# Patient Record
(demographics unavailable — no encounter records)

---

## 2025-03-26 NOTE — HISTORY OF PRESENT ILLNESS
[FreeTextEntry1] : Referring from PCP Dr. Jamison Lauren is a 56y M with HBeAg- chronic hepatitis B (Dx in 6/2022, treatment naive) who presents for the follow-up. PT also has hypertension, hyperlipidemia, diabetes on metformin 1g.  - Pt was admitted to [facility] for severe headache. Does note he had LP on 8/19, workup at the time found to have bilateral superior ophthalmic vein dilation. S/p epidural blood patch. currently on amitriptyline 25mg daily for tension type headache  Fibroscan 9/12/24  4.8kPa S2-3 F0-1 US abd were ordered but not done yet.  Today he reports feeling well and denies any physical exam.  [Medical Hx] as above [Surgical Hx] None [Social Hx] Alcohol: Denies Tobacco: Quit in 40s, 1PPD illicit drug: Denies Herb and dietary Supplement: Multi vit, Red ginseng liquid since 10/2023 [FMH of liver disease] Wife- Hep B on med  [Labs] 2/28/25 AFP 2 CBC wnl   Na 140 K 4.6 Cr 0.84 TB 0.4  AST/ALT 26/26  GGT 17 HBV PCR [id number] IU/mL Neg/wnl: AMA, IGG, IGM, ROSENDO  9/11/24 JBV PCR [id number] IU/mL  7/2024 AFP <2.5  WBC 6.6 Glucose Na, K Cr wnl HBV PCR 3890 IU/mL A1C 7.6 Lipid wnl  AST/ALT 23/27 No GGT  11/8/23 AFP 3 HBV DNA 3200 IU/mL Na 139 K 4.6 Cr 0.88  AST/ALT 26/25  TB 0.8 Alb 4.9 HbA1c 6.3  [Imaging] Us abd 8/7/24 by PCP: Steatosis, Otherwise wnl.  US abd 11/15/23 @[facility]: Mildly increased in echogenicity. No mass visualized. GB, BD, kidney unremarkable.  [Procedures] EGD 2022 Dr. Jose Tovar: normal per pt Colonoscopy 2022: normal per pt   [Assessment]   56y M with HBeAg- chronic hepatitis B (Dx in 6/2022, treatment naive)  [Plan]  # HBeAg- chronic hepatitis B # Treatment Naive ALT 26 HBV PCR [id number] IU/mL *Will start TDF to reduce the risk of developing HCC. Starting antiviral therapy aims to suppress viral replication, reduce liver inflammation, and lower this risk. - Education and counseling were provided to the patient. - Emphasized the importance of compliance with medications and HCC screening  # Health Maintenance Immune to Hep A HCV Ab Non reactive (Labs from 11/22/23).    Labs in 3 months after starting TDF Hep Delta Ab before visit Fibroscan 9/12/24  4.8kPa S2-3 F0-1 US abd in 3 months to screen HCC RTO in 3 months. If stable, every 6 months

## 2025-03-26 NOTE — HISTORY OF PRESENT ILLNESS
[FreeTextEntry1] : Referring from PCP Dr. Jamison Lauren is a 56y M with HBeAg- chronic hepatitis B (Dx in 6/2022, treatment naive) who presents for the follow-up. PT also has hypertension, hyperlipidemia, diabetes on metformin 1g.  - Pt was admitted to [facility] for severe headache. Does note he had LP on 8/19, workup at the time found to have bilateral superior ophthalmic vein dilation. S/p epidural blood patch. currently on amitriptyline 25mg daily for tension type headache  Fibroscan 9/12/24  4.8kPa S2-3 F0-1 US abd were ordered but not done yet.  Today he reports feeling well and denies any physical exam.  [Medical Hx] as above [Surgical Hx] None [Social Hx] Alcohol: Denies Tobacco: Quit in 40s, 1PPD illicit drug: Denies Herb and dietary Supplement: Multi vit, Red ginseng liquid since 10/2023 [FMH of liver disease] Wife- Hep B on med  [Labs] 2/28/25 AFP 2 CBC wnl   Na 140 K 4.6 Cr 0.84 TB 0.4  AST/ALT 26/26  GGT 17 HBV PCR [id number] IU/mL Neg/wnl: AMA, IGG, IGM, ROSENDO  9/11/24 JBV PCR [id number] IU/mL  7/2024 AFP <2.5  WBC 6.6 Glucose Na, K Cr wnl HBV PCR 3890 IU/mL A1C 7.6 Lipid wnl  AST/ALT 23/27 No GGT  11/8/23 AFP 3 HBV DNA 3200 IU/mL Na 139 K 4.6 Cr 0.88  AST/ALT 26/25  TB 0.8 Alb 4.9 HbA1c 6.3  [Imaging] Us abd 8/7/24 by PCP: Steatosis, Otherwise wnl.  US abd 11/15/23 @[facility]: Mildly increased in echogenicity. No mass visualized. GB, BD, kidney unremarkable.  [Procedures] EGD 2022 Dr. Jose Tovar: normal per pt Colonoscopy 2022: normal per pt   [Assessment]   56y M with HBeAg- chronic hepatitis B (Dx in 6/2022, treatment naive)  [Plan]  # HBeAg- chronic hepatitis B # Treatment Naive ALT 26 HBV PCR [id number] IU/mL *Will start TDF to reduce the risk of developing HCC. Starting antiviral therapy aims to suppress viral replication, reduce liver inflammation, and lower this risk. - Education and counseling were provided to the patient. - Emphasized the importance of compliance with medications and HCC screening  # Health Maintenance Immune to Hep A HCV Ab Non reactive (Labs from 11/22/23).    Labs in 3 months after starting TDF Hep Delta Ab before visit Fibroscan 9/12/24  4.8kPa S2-3 F0-1 US abd in 3 months to screen HCC RTO in 3 months. If stable, every 6 months

## 2025-03-30 NOTE — REVIEW OF SYSTEMS
[Fever] : no fever [Chills] : no chills [Feeling Tired] : not feeling tired [Eye Pain] : no eye pain [Earache] : no earache [Chest Pain] : no chest pain [Palpitations] : no palpitations [Shortness Of Breath] : no shortness of breath [Abdominal Pain] : no abdominal pain [Vomiting] : no vomiting [Limb Pain] : no limb pain [Itching] : no itching [Dizziness] : no dizziness [Fainting] : no fainting [Anxiety] : no anxiety [Easy Bleeding] : no tendency for easy bleeding

## 2025-03-30 NOTE — PHYSICAL EXAM
[Non-Tender] : non-tender [General Appearance - Alert] : alert [General Appearance - In No Acute Distress] : in no acute distress [Sclera] : the sclera and conjunctiva were normal [] : no respiratory distress [Abnormal Walk] : normal gait [Skin Color & Pigmentation] : normal skin color and pigmentation [Oriented To Time, Place, And Person] : oriented to person, place, and time [Scleral Icterus] : No Scleral Icterus [Spider Angioma] : No spider angioma(s) were observed [Abdominal  Ascites] : no ascites [Jaundice] : No jaundice [Palmar Erythema] : no Palmar Erythema

## 2025-03-30 NOTE — HISTORY OF PRESENT ILLNESS
[FreeTextEntry1] : Referring from PCP Dr. Jamison Lauren is a 56y M with HBeAg- chronic hepatitis B (Dx in 6/2022, treatment naive) who presents for the follow-up. PT also has hypertension, hyperlipidemia, diabetes on metformin 1g.  - Pt was admitted to [facility] for severe headache. Does note he had LP on 8/19, workup at the time found to have bilateral superior ophthalmic vein dilation. S/p epidural blood patch. currently on amitriptyline 25mg daily for tension type headache.   Fibroscan 9/12/24  4.8kPa S2-3 F0-1 US abd were ordered but not done yet.  Today he reports feeling well and denies any physical exam.  [Medical Hx] as above [Surgical Hx] None [Social Hx] Alcohol: Denies Tobacco: Quit in 40s, 1PPD illicit drug: Denies Herb and dietary Supplement: Multi vit, Red ginseng liquid since 10/2023 [FMH of liver disease] Wife- Hep B on med  [Labs] 2/28/25 AFP 2 CBC wnl   Na 140 K 4.6 Cr 0.84 TB 0.4  AST/ALT 26/26  GGT 17 HBV PCR [id number] IU/mL Neg/wnl: AMA, IGG, IGM, ROSENDO  9/11/24 JBV PCR [id number] IU/mL  7/2024 AFP <2.5  WBC 6.6 Glucose Na, K Cr wnl HBV PCR 3890 IU/mL A1C 7.6 Lipid wnl  AST/ALT 23/27 No GGT  11/8/23 AFP 3 HBV DNA 3200 IU/mL Na 139 K 4.6 Cr 0.88  AST/ALT 26/25  TB 0.8 Alb 4.9 HbA1c 6.3  [Imaging] Us abd 8/7/24 by PCP: Steatosis, Otherwise wnl.  US abd 11/15/23 @[facility]: Mildly increased in echogenicity. No mass visualized. GB, BD, kidney unremarkable.  [Procedures] EGD 2022 Dr. Jose Tovar: normal per pt Colonoscopy 2022: normal per pt

## 2025-03-30 NOTE — ASSESSMENT
[FreeTextEntry1] : [Assessment]   56y M with HBeAg- chronic hepatitis B (Dx in 6/2022, treatment naive)  [Plan]  # HBeAg- chronic hepatitis B # Treatment Naive 56, Male ALT 26 HBV PCR [id number] IU/mL *Will start TDF to reduce the risk of developing HCC. Starting antiviral therapy aims to suppress viral replication, reduce liver inflammation, and lower this risk. - Education and counseling were provided to the patient. - Emphasized the importance of compliance with medications and HCC screening  # Health Maintenance Immune to Hep A HCV Ab Non reactive (Labs from 11/22/23).    Labs in 3 months after starting TDF Hep Delta Ab before visit Fibroscan 9/12/24  4.8kPa S2-3 F0-1 US abd in 3 months to screen HCC RTO in 3 months. If stable, every 6 months